# Patient Record
Sex: FEMALE | Race: WHITE | URBAN - METROPOLITAN AREA
[De-identification: names, ages, dates, MRNs, and addresses within clinical notes are randomized per-mention and may not be internally consistent; named-entity substitution may affect disease eponyms.]

---

## 2017-03-18 ENCOUNTER — COMMUNICATION - HEALTHEAST (OUTPATIENT)
Dept: FAMILY MEDICINE | Facility: CLINIC | Age: 32
End: 2017-03-18

## 2017-03-18 DIAGNOSIS — Z30.9 CONTRACEPTION MANAGEMENT: ICD-10-CM

## 2017-03-18 DIAGNOSIS — Z78.9: ICD-10-CM

## 2017-05-16 ENCOUNTER — COMMUNICATION - HEALTHEAST (OUTPATIENT)
Dept: FAMILY MEDICINE | Facility: CLINIC | Age: 32
End: 2017-05-16

## 2017-05-23 ENCOUNTER — OFFICE VISIT - HEALTHEAST (OUTPATIENT)
Dept: FAMILY MEDICINE | Facility: CLINIC | Age: 32
End: 2017-05-23

## 2017-05-23 DIAGNOSIS — Z00.00 VISIT FOR PREVENTIVE HEALTH EXAMINATION: ICD-10-CM

## 2017-05-23 DIAGNOSIS — F41.1 GENERALIZED ANXIETY DISORDER: ICD-10-CM

## 2017-05-23 DIAGNOSIS — N63.20 LEFT BREAST LUMP: ICD-10-CM

## 2017-05-23 DIAGNOSIS — G47.00 INSOMNIA: ICD-10-CM

## 2017-05-23 ASSESSMENT — MIFFLIN-ST. JEOR: SCORE: 1288.04

## 2017-05-26 ENCOUNTER — HOSPITAL ENCOUNTER (OUTPATIENT)
Dept: MAMMOGRAPHY | Facility: CLINIC | Age: 32
Discharge: HOME | End: 2017-05-26
Attending: FAMILY MEDICINE

## 2017-05-26 ENCOUNTER — HOSPITAL ENCOUNTER (OUTPATIENT)
Dept: ULTRASOUND IMAGING | Facility: CLINIC | Age: 32
Discharge: HOME | End: 2017-05-26
Attending: FAMILY MEDICINE

## 2017-05-26 DIAGNOSIS — N63.20 LEFT BREAST LUMP: ICD-10-CM

## 2017-06-02 ENCOUNTER — COMMUNICATION - HEALTHEAST (OUTPATIENT)
Dept: FAMILY MEDICINE | Facility: CLINIC | Age: 32
End: 2017-06-02

## 2017-06-02 DIAGNOSIS — Z30.9 CONTRACEPTION MANAGEMENT: ICD-10-CM

## 2017-06-02 DIAGNOSIS — Z78.9: ICD-10-CM

## 2018-08-06 ENCOUNTER — COMMUNICATION - HEALTHEAST (OUTPATIENT)
Dept: FAMILY MEDICINE | Facility: CLINIC | Age: 33
End: 2018-08-06

## 2019-04-01 ENCOUNTER — OFFICE VISIT (OUTPATIENT)
Dept: ORTHOPEDICS | Facility: CLINIC | Age: 34
End: 2019-04-01
Payer: COMMERCIAL

## 2019-04-01 ENCOUNTER — ANCILLARY PROCEDURE (OUTPATIENT)
Dept: GENERAL RADIOLOGY | Facility: CLINIC | Age: 34
End: 2019-04-01
Attending: FAMILY MEDICINE
Payer: COMMERCIAL

## 2019-04-01 VITALS
BODY MASS INDEX: 25.61 KG/M2 | DIASTOLIC BLOOD PRESSURE: 77 MMHG | WEIGHT: 150 LBS | HEIGHT: 64 IN | SYSTOLIC BLOOD PRESSURE: 130 MMHG

## 2019-04-01 DIAGNOSIS — M25.532 LEFT WRIST PAIN: ICD-10-CM

## 2019-04-01 DIAGNOSIS — M25.532 LEFT WRIST PAIN: Primary | ICD-10-CM

## 2019-04-01 RX ORDER — LEVONORGESTREL AND ETHINYL ESTRADIOL 0.1-0.02MG
1 KIT ORAL DAILY
Refills: 3 | COMMUNITY
Start: 2019-03-13

## 2019-04-01 RX ORDER — LORAZEPAM 1 MG/1
TABLET ORAL
Refills: 1 | COMMUNITY
Start: 2019-03-01

## 2019-04-01 RX ORDER — ESCITALOPRAM OXALATE 20 MG/1
TABLET ORAL
COMMUNITY
Start: 2018-11-10

## 2019-04-01 ASSESSMENT — MIFFLIN-ST. JEOR: SCORE: 1375.4

## 2019-04-01 NOTE — PROGRESS NOTES
SPORTS & ORTHOPEDIC WALK-IN VISIT 4/1/2019    Primary Care Physician:    Patient [place] had [place]OSH injury to left hand/thumb.     Reason for visit:     What part of your body is injured / painful?  left thumb    What caused the injury /pain? Fall    How long ago did your injury occur or pain begin? several months ago    What are your most bothersome symptoms? Pain and Swelling    How would you characterize your symptom?  aching    What makes your symptoms better? Rest    What makes your symptoms worse? Movement    Have you been previously seen for this problem? No    Medical History:    Any recent changes to your medical history? No    Any new medication prescribed since last visit? No    Have you had surgery on this body part before? 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     Handedness: Left    Exercise: Yoga     Review of Systems:    Do you have fever, chills, weight loss? No    Do you have any vision problems? No    Do you have any chest pain or edema? No    Do you have any shortness of breath or wheezing?  No    Do you have stomach problems? No    Do you have any numbness or focal weakness? No    Do you have diabetes? No    Do you have problems with bleeding or clotting? No    Do you have an rashes or other skin lesions? No

## 2019-04-01 NOTE — PROGRESS NOTES
[facility]  Orthopedics  Vladimir Doan MD  2019     Name: Marta Wolff  MRN: [id number]  Age: 33 year old  : 1985  Referring provider: Referred Self     Chief Complaint:   Consult (left hand)    History of Present Illness:   Marta Wolff is a 33 year old, left handed female who presents today for evaluation of left hand pain beginning approximately two months ago [place] and [place] onto an outstretched left arm, sustaining an injury to her left thumb. She indicates this was immediately painful and she soon after developed the onset of swelling and bruising. Since this time, the bruising has improved, however, she has had continued swelling and achy pain localized to the base of her left thumb. Rest improves the pain while activities, specifically writing and yoga, make the pain worse. She has been using ice, ibuprofen, and a brace for pain management at home. She has only been using the brace for roughly three days, but states it has been helpful.     Review of Systems:   A 10-point review of systems was obtained and is negative except for as noted in the HPI.     Medications:      escitalopram (LEXAPRO) 20 MG tablet, TAKE 1/2 TABLET (10 MG) BY MOUTH DAILY FOR 1 WEEK, THEN INCREASE 1 FULL TABLET DAILY AS TOLERATED., Disp: , Rfl:      LORazepam (ATIVAN) 1 MG tablet, TAKE 1/2 TO 1 TAB AT BEDTIME FOR INSOMNIA. DON'T TAKE W/ PRAZOSIN UNLESS YOU NEED BOTH TO SLEEP, Disp: , Rfl: 1     SRONYX 0.1-20 MG-MCG tablet, Take 1 tablet by mouth daily, Disp: , Rfl: 3    Allergies:  No Known Allergies.    Past Medical History:  The patient denies any pertinent past medical history.     Past Surgical History:  The patient does not have any pertinent past surgical history.     Social History:  She is single. Non-smoker. 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     Family History:  No past pertinent family history.     Physical Examination:  Blood  "pressure 130/77, height 1.626 m (5' 4\"), weight 68 kg (150 lb).  Exam:  Patient is alert, in no acute distress, pleasant and conversational    Left Wrist/Hand:  Skin intact, no erythema, rash, or ecchymosis. No skin breakdown.  No soft tissue swelling or joint effusion of the wrist or hand. No atrophy of thenar or hypothenar emminances.   Full flexion and extension of the wrist and elbow. Some pain with wrist extension.     Range of Motion:  Wrist AROM: Flexion: Approximately 90 , Extension: Approximately 60 , Supination: Approximately 90 , Pronation Approximately 90 .    Hand AROM: Full flexion and extension of the DIP, PIP and MCP joints of digits #2-5.   Thumb with full flexion and extension of IP and MCP joints, opposition, abduction and adduction intact.    ROM is symmetric with uninjured side     Strength Testing:  Wrist: Extension: 5/5, Flexion: 5/5, Supination: 5/5, Pronation: 5/5, Ulnar deviation: 5/5, Radial deviation: 5/5  Hand: Full strength with flexion, extension, abduction and adduction of the phalanges #2-5, Full strength with flexion/extension, abduction, adduction and opposition of the thumb. No subjective pain reported with strength testing.     Palpation:  Tenderness at the proximal pole of the scaphoid  Negative tenderness with compression loading of the scaphoid  Negative tenderness to palpation of the other carpal bones  Negative tenderness to palpation of the metacarpals  Negative tenderness to palpation of the phalanges    Negative tenderness to palpation of the distal radius or distal ulna  Negative tenderness to palpation of the radial head    Negative carpal instability on exam    Special Tests:   Clicking with Kuhn's test bilaterally, though painful on the left.      +2/4 radial pulse, less than 2 second capillary refill  Sensation is intact throughout the hand to light touch.     Imaging:   Radiographs of the left wrist - 3 views (04/01/2019):  No acute osseous abnormality. No " substantial degenerative changes. Soft tissue unremarkable. Negative ulnar variance.  Per Radiology.     I have independently reviewed the above imaging studies; the results were discussed with the patient.     Assessment:   33 year old, left handed female who presents with left hand and thumb pain after a FOOSH injury two months ago. Concern for scaphoid contusion. No sign of fracture on x-ray imaging. Possible ligamentous injury.     Plan:   - Continue with bracing, over-the-counter pain medication, ice, and activity modification as needed.   - Referral to hand therapy was provided along with a home exercise program.   - If having continued symptoms in 4 weeks, may consider MR imaging for further evaluation.   - Follow up as needed for new, persistent, or worsening symptoms in 4 weeks.     Vladimir Doan MD    Scribe Disclosure:   INoé, am serving as a scribe to document services personally performed by Vladimir Doan MD at this visit, based upon the provider's statements to me. All documentation has been reviewed by the aforementioned provider prior to being entered into the official medical record.

## 2019-04-29 ENCOUNTER — ANCILLARY PROCEDURE (OUTPATIENT)
Dept: MRI IMAGING | Facility: CLINIC | Age: 34
End: 2019-04-29
Attending: FAMILY MEDICINE
Payer: COMMERCIAL

## 2019-04-29 ENCOUNTER — OFFICE VISIT (OUTPATIENT)
Dept: ORTHOPEDICS | Facility: CLINIC | Age: 34
End: 2019-04-29
Payer: COMMERCIAL

## 2019-04-29 VITALS — WEIGHT: 150 LBS | HEIGHT: 64 IN | BODY MASS INDEX: 25.61 KG/M2 | RESPIRATION RATE: 16 BRPM

## 2019-04-29 DIAGNOSIS — M79.645 THUMB PAIN, LEFT: ICD-10-CM

## 2019-04-29 DIAGNOSIS — S62.009A SCAPHOID FRACTURE OF WRIST: ICD-10-CM

## 2019-04-29 DIAGNOSIS — M79.645 THUMB PAIN, LEFT: Primary | ICD-10-CM

## 2019-04-29 ASSESSMENT — MIFFLIN-ST. JEOR: SCORE: 1375.4

## 2019-04-29 NOTE — PROGRESS NOTES
"[facility]  Orthopedics  Vladimir Doan MD  2019     Name: Marta Wolff  MRN: [id number]  Age: 33 year old  : 1985  Referring provider: Referred Self     Chief Complaint: Pain of the Left Wrist         History of Present Illness:   Marta Wolff is a 33 year old, left-handed, female who presents today for follow-up regarding left hand pain. I last saw her on 19 for initial evaluation at which time she reported an onset of left hand pain that began after slipping and falling onto an outstretched left arm, about 2 months prior to the visit.  Plan at that time was to continue bracing, over-the-counter pain medication, ice, and activity modification as needed.    Today, she continues to endorse pain near the base of her left thumb, and near her forearm. She wore a thumb spica brace up until about 5 days ago, but stopped because brace started to hurt. She has tried conservative measures, but her pain has not improved. Denies numbness and tingling.  No new symptoms.      Review of Systems:   A 10-point review of systems was obtained and is negative except for as noted in the HPI.     Physical Examination:  Resp. rate 16, height 1.626 m (5' 4\"), weight 68 kg (150 lb).  Exam:  Patient is alert, in no acute distress, pleasant and conversational    Left hand: There is mild TTP at the snuffbox dorsally.  More TTP over the volar aspect of the thenar eminence, particularly proximally.  But diffusely has pain along the first metacarpal including both the CMC and MCP.  No pain over the remaining carpal bones scapholunate or DRUJ.      Assessment:   33 year old, left handed female who presents with left hand and thumb pain after a FOOSH injury roughly 3 months ago. Concern for scaphoid contusion/occult fx. .       Plan:     We will obtain an MRI for further evaluation.     Follow-up after MRI results.     Vladimir Doan MD          Scribe Disclosure:  I, Darrin Daly, am serving as a scribe to document services personally " performed by Vladimir Doan MD at this visit, based upon the provider's statements to me. All documentation has been reviewed by the aforementioned provider prior to being entered into the official medical record.

## 2019-04-29 NOTE — PROGRESS NOTES
SPORTS & ORTHOPEDIC WALK-IN FOLLOW-UP VISIT 4/29/2019    Interval History:     Follow up reason: Continued left wrist pain    Date of injury: Several months ago    Date last seen: 4/1/19    Following Therapeutic Plan: No    Pain: Improving slight    Function: Improving slight    Interval History: wore a thumb spica brace up until about 5 days ago, stopped because brace started to hurt. Would take it off to eat or shower but otherwise wore it all the time. Musculature in forearm is more sore now. Has been trying to rest hand but every time she tries to use it, it gets worse again. Last week used left hand to hold on to pole on bus and hand throbbed for several hours after. Was out of town a lot so didn't go to hand therapy but did exercise program.     Medical History:    Any recent changes to your medical history? No    Any new medication prescribed since last visit? No    Review of Systems:    Do you have fever, chills, weight loss? No    Do you have any vision problems? No    Do you have any chest pain or edema? No    Do you have any shortness of breath or wheezing?  No    Do you have stomach problems? No    Do you have any numbness or focal weakness? No    Do you have diabetes? No    Do you have problems with bleeding or clotting? No    Do you have an rashes or other skin lesions? No

## 2019-05-02 ENCOUNTER — OFFICE VISIT (OUTPATIENT)
Dept: ORTHOPEDICS | Facility: CLINIC | Age: 34
End: 2019-05-02
Payer: COMMERCIAL

## 2019-05-02 DIAGNOSIS — M79.645 PAIN OF LEFT THUMB: Primary | ICD-10-CM

## 2019-05-02 NOTE — PROGRESS NOTES
SPORTS & ORTHOPEDIC WALK-IN FOLLOW-UP VISIT 5/2/2019    Interval History:     Follow up reason: MRI results     Date of injury: Several months ago    Date last seen: 4/29/19    Following Therapeutic Plan: Yes     Pain: Unchanged    Function: Unchanged    Interval History: Left wrist and thumb pain the same as previous.  Here today for follow-up of MRI results.    Medical History:    Any recent changes to your medical history? No    Any new medication prescribed since last visit? No    Review of Systems:    Do you have fever, chills, weight loss? No    Do you have any vision problems? No    Do you have any chest pain or edema? No    Do you have any shortness of breath or wheezing?  No    Do you have stomach problems? No    Do you have any numbness or focal weakness? No    Do you have diabetes? No     Do you have problems with bleeding or clotting? No    Do you have an rashes or other skin lesions? No         EXAM:There were no vitals taken for this visit.    General: Alert, pleasant, no distress  No exam this visit.    Imaging: MRI left wrist performed on 4/29/2019 and report per radiology:  IMPRESSION:  1. Underlying the external marker there is mild edema involving the  abductor pollicis brevis muscle which may represent contusion.  2.  No acute osseous abnormality.  3. Scapholunate ligament intact. No widening of the scapholunate  interval.  4. Mild subluxation of extensor carpi ulnaris.    Assessment: Patient is a 33 year old left-hand-dominant female with persistent left thumb pain after FOOSH injury in January.  Soft tissue edema evident on MRI.  No significant structural abnormality.    Recommendations:   Reviewed imaging with the patient in detail  Recommended course of hand therapy.  May also be able to provide her with a splint that she can use while at work.  No need to follow-up if she experiences improvement with therapy.  Otherwise follow-up in 1 month or as needed.    Vladimir Doan,  MD

## 2019-05-13 ENCOUNTER — THERAPY VISIT (OUTPATIENT)
Dept: OCCUPATIONAL THERAPY | Facility: CLINIC | Age: 34
End: 2019-05-13
Attending: FAMILY MEDICINE
Payer: COMMERCIAL

## 2019-05-13 DIAGNOSIS — M25.532 LEFT WRIST PAIN: ICD-10-CM

## 2019-05-13 DIAGNOSIS — M79.645 THUMB PAIN, LEFT: ICD-10-CM

## 2019-05-13 DIAGNOSIS — M79.645 PAIN OF LEFT THUMB: Primary | ICD-10-CM

## 2019-05-13 PROCEDURE — 97760 ORTHOTIC MGMT&TRAING 1ST ENC: CPT | Mod: GO | Performed by: OCCUPATIONAL THERAPIST

## 2019-05-13 PROCEDURE — 97165 OT EVAL LOW COMPLEX 30 MIN: CPT | Mod: GO | Performed by: OCCUPATIONAL THERAPIST

## 2019-05-13 PROCEDURE — 97110 THERAPEUTIC EXERCISES: CPT | Mod: GO | Performed by: OCCUPATIONAL THERAPIST

## 2019-05-13 NOTE — PROGRESS NOTES
Hand Therapy Initial Evaluation    Current Date:  5/13/2019    Diagnosis: L thumb/wrist pain  DOI: fall in January 2019, orders 5/2/19    Precautions: NA    Subjective:  Marta Wolff is a 34 year old L hand dominant female.    Patient reports symptoms of pain of the left wrist, thumb which occurred due to FOOSH injury. Since onset symptoms are Unchanged  Special tests:  x-ray and MRI.  Previous treatment: splint x 1 month.    General health as reported by patient is good.  Pertinent medical history includes:None  Medical allergies:none.  Surgical history: none.  Medication history: Anti-depressants.    Pt wore a splint for a month, on the L hand. It did not seem to help much. [place].    Occupational Profile Information:  Current occupation is [employer]  Currently working in normal job without restrictions  Job Tasks: Computer Work, Lifting, Carrying, Repetitive Tasks, meetings, takes notes in meetings. Has a standing desk and a table.  Prior functional level:  no limitations  Barriers include:none  Mobility: No difficulty  Transportation: drives and public transportation  Leisure activities/hobbies: yoga (can not do at present), drawing, painting, reading - hurts to hold a book (using a hortensia in R hand)    Functional Outcome Measure:   Upper Extremity Functional Index Score:  SCORE:   Column Totals: /80: 36   (A lower score indicates greater disability.)    Objective:  Pain Level (Scale 0-10):   5/13/2019   At Rest 3   With Use 4-5 (at worst)     Pain Description:  Date 5/13/2019   Location Base of L thumb, radial volar wrist   Pain Quality Aching, Dull, Exhausting, Nagging, Pressure, Tender, Throbbing and Tiring   Frequency constant     Pain is worst  daytime - not waking pt up at night lately   Exacerbated by  , pull   Relieved by cold, NSAIDs and rest   Progression Not improving     Edema:  MILD, of the L thenar eminence - not pt has not used the thumb much the past few  days  Sensation:  WNL throughout all nerve distributions; per patient report    ROM  Thumb   5/13/2019 5/13/2019   AROM  (PROM) R L   MP 24 39   IP 67 77   Kapandji Opposition Scale (0-10/10) 10 10+ to volar radial wrist     Thumb ROM IMD  Intermetacarpal Distance Measure in mm-digital caliper  5/13/2019 Rabd PABD    RIGHT LEFT RIGHT LEFT   IMD RATER #1 61.24 62.53 61.05 66.34   IMD RATER #2 57.90 61.20 59.5 64.37   PAIN (NRS) #1 0 2 0 0   PAIN (NRS) #2 0 0 0 2-3     Strength:  Pain-free /Pinch Test    5/13/2019   Trials R L   1   58 NT     Lat Pinch  5/13/2019   Trials R L   1   20.5 NT     3 Pt Pinch  5/13/2019   Trials R L   1   14.5 NT       Assessment:  Patient presents with symptoms consistent with diagnosis of L hand/thumb/wrist pain,  with conservative intervention.     Patient's limitations or Problem List includes:  Pain, Decreased ROM/motion, Increased edema and Weakness of the left wrist and thumb which interferes with the patient's ability to perform Self Care Tasks (dressing), Work Tasks, Sleep Patterns, Recreational Activities, Household Chores and Driving  as compared to previous level of function.    Rehab Potential:  Excellent - Return to full activity, no limitations    Patient will benefit from skilled Occupational Therapy to increase ROM,  strength, pinch strength and stability of thumb and decrease pain to return to previous activity level and resume normal daily tasks and to reach their rehab potential.    Barriers to Learning:  No barrier    Communication Issues:  Patient appears to be able to clearly communicate and understand verbal and written communication and follow directions correctly.    Chart Review: Brief history including review of medical and/or therapy records relating to the presenting problem and Simple history review with patient    Identified Performance Deficits: dressing, hygiene and grooming, home establishment and management, meal preparation and cleanup,  shopping, work and leisure activities    Assessment of Occupational Performance:  5 or more Performance Deficits    Clinical Decision Making (Complexity): Low complexity    Treatment Explanation:  The following has been discussed with the patient:  RX ordered/plan of care  Anticipated outcomes  Possible risks and side effects    Plan:  Frequency:  1 X week, once daily  Duration:  for 4 weeks tapering to 2 X a month over 4 weeks    Treatment Plan:   Modalities:  US and Paraffin  Therapeutic Exercise:  AROM, Tendon Gliding, Isotonics, Isometrics and Stabilization  Neuromuscular re-education:  Proprioceptive Training and Kinesiotaping  Manual Techniques:  Joint mobilization, Myofascial release and Manual edema mobilization  Orthotic Fabrication:  Static orthosis, Hand based orthosis and Forearm based orthosis  Self Care:  Self Care Tasks, Ergonomic Considerations and Work Tasks  Discharge Plan:  Achieve all LTG.  Independent in home treatment program.  Reach maximal therapeutic benefit.    Home Exercise Program:  HBTSO full time/percomfort  AROM gently to thumb and wrist    Next Visit:  Check symptoms, orthosis, HEP, function  Pt is moving to a new position

## 2019-08-14 PROBLEM — M25.532 LEFT WRIST PAIN: Status: RESOLVED | Noted: 2019-05-13 | Resolved: 2019-08-14

## 2019-08-14 PROBLEM — M79.645 THUMB PAIN, LEFT: Status: RESOLVED | Noted: 2019-05-13 | Resolved: 2019-08-14

## 2019-08-14 NOTE — PROGRESS NOTES
Discharge Summary - Hand Therapy    8/14/2019    Patient did not return to therapy.  We will assume that patient's goals were met.  This episode of care will be resolved.  Plan: Discharge from hand therapy.    Marlene Watson MS, OTR/L

## 2020-03-11 ENCOUNTER — HEALTH MAINTENANCE LETTER (OUTPATIENT)
Age: 35
End: 2020-03-11

## 2021-01-03 ENCOUNTER — HEALTH MAINTENANCE LETTER (OUTPATIENT)
Age: 36
End: 2021-01-03

## 2021-04-25 ENCOUNTER — HEALTH MAINTENANCE LETTER (OUTPATIENT)
Age: 36
End: 2021-04-25

## 2021-05-31 VITALS — HEIGHT: 64 IN | WEIGHT: 133 LBS | BODY MASS INDEX: 22.71 KG/M2

## 2021-06-10 NOTE — PROGRESS NOTES
FEMALE ADULT PREVENTIVE EXAM    CHIEF COMPLAINT:  Female preventive exam.    SUBJECTIVE:  Marta Wolff is a 32 y.o. female who presents for her routine physical exam.    Patient would like to address the following concerns today: Mild depression and anxiety, has been seen a psychiatrist at [facility].  Lexapro seems to help, at least taking the edge off, trazodone has not helped, she is planning to follow with her psychiatrist to discuss about other alternative.  She has been walking for the [employer] [facility], dealing with refugee program and resettlement.  Left breast lump, find out about 5 weeks ago, did no change, mildly tender.  No drainage.  GYNE HISTORY  Menses: yes  Sexually Active: yes  Contraception: yes  Last Pap: 2016  Abnormal Pap: no  Vaginal Discharge: no      She  has no past medical history on file.    Lab Results   Component Value Date    WBC 4.4 03/09/2016    HGB 14.6 03/09/2016    HCT 44.0 03/09/2016    MCV 92 03/09/2016     03/09/2016     Lab Results   Component Value Date    CHOL 193 03/09/2016    HDL 59 03/09/2016    LDLCALC 117 03/09/2016    TRIG 86 03/09/2016     Lab Results   Component Value Date    TSH 1.13 03/09/2016     BP Readings from Last 3 Encounters:   05/23/17 122/62   05/17/16 98/64   03/09/16 92/54       Surgeries:  No past surgical history on file.    Family History:  Her family history includes Alcohol abuse in her brother, maternal grandfather, maternal uncle, paternal aunt, paternal grandfather, and paternal uncle; Breast cancer in her paternal aunt; Cancer in her maternal grandfather, paternal aunt, paternal grandfather, paternal grandmother, and paternal uncle; Colon cancer in her paternal grandmother; Dementia in her maternal grandfather; Depression in her maternal grandfather and mother; Diabetes in her maternal grandmother; Drug abuse in her brother; Early death in her maternal grandmother and paternal grandfather; Heart disease in her  paternal grandfather; Mental illness in her mother and paternal uncle; Prostate cancer in her paternal grandfather; Vision loss in her maternal grandmother.    Social History:  She  reports that she has never smoked. She does not have any smokeless tobacco history on file.    Medications:    Current Outpatient Prescriptions:      escitalopram oxalate (LEXAPRO) 10 MG tablet, Take 10 mg by mouth daily., Disp: , Rfl:      traZODone (DESYREL) 25 mg, Take 50 mg by mouth at bedtime., Disp: , Rfl:      ORSYTHIA 0.1-20 mg-mcg per tablet, TAKE 1 TABLET BY MOUTH DAILY., Disp: 84 tablet, Rfl: 0  HELD MEDICATIONS: None.    Allergies:  No latex allergies.  No Known Allergies         RISK BEHAVIOR & HEALTH HABITS  Self Breast Exam: yes.  Regular Exercise: yes.  Balanced diet: yes.  Seat Belt Use: yes  Calcium intake/Osteoporosis prevention: yes.    Guns: NA  Sun Screen: YES  Dental Care: YES    REVIEW OF SYSTEMS:  Complete head to toe review of systems is otherwise negative except as above.    OBJECTIVE:  VITAL SIGNS:    Vitals:    05/23/17 1432   BP: 122/62   Pulse: 68   Resp: 14   Temp: 98.2  F (36.8  C)     GENERAL:  Patient alert, in no acute distress.  EYES: PERRLA. Extraoccular movements intact, pupils equal, reactive to light and accommodation.  Normal conjunctiva and lids.    ENT:  Hearing grossly normal.  Normal appearance to ears and nose.  Bilateral TM s, external canals, oropharynx normal. Normal lips, gums and teeth.    NECK:  Supple, without thyromegaly or mass, no adenopathies.  RESP:  Clear to auscultation without crackles, wheezes or distress.  Normal respiratory effort.   CV:  Regular rate and rhythm without murmurs, rubs or gallops.  Normal pedal pulses.  No varicosities or edema.  ABDOMEN:  Soft, non-tender, without hepatosplenomegaly, masses, or hernias.   BREASTS: 2 x 2 cm small periareolar lump left breast mildly tender and indurated at around 4:00 area.  : deferred  Rectal: deferred  LYMPHATIC: Normal  palpation of neck.  No lymphadenopathy.  No bruising.  NEURO:  CN II-XII intact, motor & sensory function all intact.  DTR and reflexes normal.  PSYCHIATRIC:  Alert & oriented with normal mood and affect.  Good judgment and insight.  SKIN:  Normal inspection and palpation.  MUSCULOSKELETAL: Normal gait and station.  - Spine / Ribs / Pelvis: Normal inspection, ROM, stability and strength: Spine, Head, Neck, Upper and Lower Extremities.    ASSESSMENT & PLAN  Marta was seen today for annual exam, breast mass and medication refill.    Diagnoses and all orders for this visit:    Visit for preventive health examination    Generalized anxiety disorder  Appears controlled with Lexapro 10 mg, she will follow with her psychiatrist for long-term management.  Insomnia  Currently on trazodone, encouraged to continue to work on a sleep hygiene, she will follow with her psychiatrist to discuss about other alternative.  Left breast lump  -     Cancel: Mammo Diagnostic Left; Future  -     US Breast Limited (Focal) Left; Future  -     Mammo Diagnostic Bilateral; Future      General  Immunizations reviewed and updated .  Alcohol use, safety and moderation discussed.  Recommended adequate calcium, vitamin D intake/osteoporosis prevention.  Discussed colon cancer screening at age 50, 45 if -American.  Diet and exercise reviewed, including goal of aerobic exercise 30-90 minutes most days of the week, moderation of portions sizes, avoiding eating out and fast food and increase in fruits and vegetables.  Discussed safe sex practices.    Discussed & recommended seat belt (& motorcycle helmet) use.  Discussed & recommended smoke detector.  Discussed sun protection.  Discussed weight management.  Discussed self breast exam, screening mammogram timing.  I have had an Advance Directives discussion with the patient.    Tom Maher MD

## 2022-05-21 ENCOUNTER — HEALTH MAINTENANCE LETTER (OUTPATIENT)
Age: 37
End: 2022-05-21

## 2023-06-04 ENCOUNTER — HEALTH MAINTENANCE LETTER (OUTPATIENT)
Age: 38
End: 2023-06-04